# Patient Record
Sex: FEMALE | Race: WHITE | NOT HISPANIC OR LATINO
[De-identification: names, ages, dates, MRNs, and addresses within clinical notes are randomized per-mention and may not be internally consistent; named-entity substitution may affect disease eponyms.]

---

## 2022-07-22 PROBLEM — Z00.00 ENCOUNTER FOR PREVENTIVE HEALTH EXAMINATION: Status: ACTIVE | Noted: 2022-07-22

## 2022-08-02 ENCOUNTER — NON-APPOINTMENT (OUTPATIENT)
Age: 32
End: 2022-08-02

## 2022-08-02 ENCOUNTER — APPOINTMENT (OUTPATIENT)
Dept: GASTROENTEROLOGY | Facility: CLINIC | Age: 32
End: 2022-08-02

## 2022-08-02 VITALS
BODY MASS INDEX: 20.89 KG/M2 | TEMPERATURE: 100.1 F | WEIGHT: 130 LBS | OXYGEN SATURATION: 98 % | SYSTOLIC BLOOD PRESSURE: 100 MMHG | DIASTOLIC BLOOD PRESSURE: 60 MMHG | HEIGHT: 66 IN | HEART RATE: 106 BPM

## 2022-08-02 DIAGNOSIS — U09.9 POST COVID-19 CONDITION, UNSPECIFIED: ICD-10-CM

## 2022-08-02 DIAGNOSIS — R50.9 FEVER, UNSPECIFIED: ICD-10-CM

## 2022-08-02 DIAGNOSIS — D89.40 MAST CELL ACTIVATION, UNSPECIFIED: ICD-10-CM

## 2022-08-02 DIAGNOSIS — R14.2 ERUCTATION: ICD-10-CM

## 2022-08-02 DIAGNOSIS — K21.9 GASTRO-ESOPHAGEAL REFLUX DISEASE W/OUT ESOPHAGITIS: ICD-10-CM

## 2022-08-02 PROCEDURE — 99205 OFFICE O/P NEW HI 60 MIN: CPT

## 2022-08-02 NOTE — PHYSICAL EXAM
[General Appearance - Alert] : alert [Sclera] : the sclera and conjunctiva were normal [Outer Ear] : the ears and nose were normal in appearance [Neck Appearance] : the appearance of the neck was normal [Heart Rate And Rhythm] : heart rate was normal and rhythm regular [Edema] : there was no peripheral edema [Bowel Sounds] : normal bowel sounds [Abdomen Soft] : soft [Abdomen Tenderness] : non-tender [Abdomen Mass (___ Cm)] : no abdominal mass palpated [No CVA Tenderness] : no ~M costovertebral angle tenderness [Abnormal Walk] : normal gait [] : no rash [No Focal Deficits] : no focal deficits [Affect] : the affect was normal

## 2022-08-02 NOTE — HISTORY OF PRESENT ILLNESS
[de-identified] : 31F with hx salmonella poisoning 2017 with ED viist, wisdom teeth, mold exposure presents for GI EVALUATION \par RUQ 2020 bad but went away\par FEB/MARCH 2021 COVID VACCINE \par THEN 4/2021, BELCHING after eating and drinking, ignored it thinking it was indigestion\par some DIARRHEA but was ignoring it (baseline was constipation)\par took a sip of a fruit juice drink and started SNEEZING\par black tarry stool negative for blood\par VOMITING NAUSEOUS then NERVE DAMAGE NUMBNESS AND TINGLING, loss of sensation and weakness\par +SMALL FIBER NEUROPATHY --> s/p LDN, IvIg but cant handle it, gabapentin support\par +SIBO Hydrogen - elemental diet but still belching then retested and was SIBO NEGATIVE and was still belching and not well \par then retested later and was positive again\par  H pylori on GI MAP and did antibiotics and belching didn’t go away\par MUCUS IN HER MOUTH \par FEELS MALNOURISHED \par has seen immunologist, rheumatologist\par HIVES AFTER EATING\par MRI shows stool, fibroids/ovarian cyst. no endometriosis workup \par current diet= gluten free\par surgery= wisdom teeth\par meds= motegrity (on hold right nowfor sibo test)\par FHX= psoriasis, POOJA, afib ,celiac, ALS\par born c/s ,  for a month, kidney reflux as a kid on daily antibiotic for months, on OCP 10 years- BAD PERIODS HEAVY, no excessive antiacids, nsaids. accutane\par s/p month of xifaxan - no help\par motegrity helps constipation\par smartpill= slight delay in stomach/colon\par \par #1 severe reflux that causes sob and belching., nausea, regurgitaiton, mucus, 3cm\par #2 constipation\par #3 hives after eating\par #4 RUQ pain\par

## 2022-08-02 NOTE — ASSESSMENT
[FreeTextEntry1] : 31F with possible long covid, possible MCAS, recurrent SIBO only responded to elemental diet in past, dyssynergic defecation, fibroids/ovarian cysts and abnormal menstruation, small fiber neuropathy, constipation, high zonulin, nml cscope/egd with nml bx of colon, duodenum, esoph, nml hida\par - HRM and impedence study\par - referral to [facility] for long haul\par - f/u neurologist\par - f/u repeat SIBO test\par - consider retesting h pylori\par - continue motegrity as prescribed\par - consider further biofeedback\par - extensive scounseling provided and extesnive chart review\par - f/u 6-8wks

## 2022-11-18 ENCOUNTER — APPOINTMENT (OUTPATIENT)
Dept: GASTROENTEROLOGY | Facility: CLINIC | Age: 32
End: 2022-11-18

## 2022-11-18 PROCEDURE — 99215 OFFICE O/P EST HI 40 MIN: CPT | Mod: 95

## 2022-11-18 NOTE — HISTORY OF PRESENT ILLNESS
[de-identified] : 31F with hx salmonella poisoning 2017 with ED viist, wisdom teeth, mold exposure presents for GI EVALUATION \par 11/18/22\par saw tiffanie parkih did long covid protocol asa, prednisone\par mod stool burden on XRAY on [facility]OTE[facility] \par trialed h1 blocker \par ivig for small fiber neuropathy got ill on 4th round \par bad acne and hair loss \par BELCHING\par bm all over the place incomplete [place] yellow or pale\par [place] yellow stool disintigrates \par \par \par PREVIOUS HX\par RUQ 2020 bad but went away\par FEB/MARCH 2021 COVID VACCINE \par THEN 4/2021, BELCHING after eating and drinking, ignored it thinking it was indigestion\par some DIARRHEA but was ignoring it (baseline was constipation)\par took a sip of a fruit juice drink and started SNEEZING\par black tarry stool negative for blood\par VOMITING NAUSEOUS then NERVE DAMAGE NUMBNESS AND TINGLING, loss of sensation and weakness\par +SMALL FIBER NEUROPATHY --> s/p LDN, IvIg but cant handle it, gabapentin support\par +SIBO Hydrogen - elemental diet but still belching then retested and was SIBO NEGATIVE and was still belching and not well \par then retested later and was positive again\par  H pylori on GI MAP and did antibiotics and belching didn’t go away\par MUCUS IN HER MOUTH \par FEELS MALNOURISHED \par has seen immunologist, rheumatologist\par HIVES AFTER EATING\par MRI shows stool, fibroids/ovarian cyst. no endometriosis workup \par current diet= gluten free\par surgery= wisdom teeth\par meds= motegrity (on hold right nowfor sibo test)\par FHX= psoriasis, POOJA, afib ,celiac, ALS\par born c/s ,  for a month, kidney reflux as a kid on daily antibiotic for months, on OCP 10 years- BAD PERIODS HEAVY, no excessive antiacids, nsaids. accutane\par s/p month of xifaxan - no help\par motegrity helps constipation\par smartpill= slight delay in stomach/colon\par \par #1 severe reflux that causes sob and belching., nausea, regurgitaiton, mucus, 3cm\par #2 constipation\par #3 hives after eating\par #4 RUQ pain\par

## 2022-11-18 NOTE — REASON FOR VISIT
[Follow-up] : a follow-up of an existing diagnosis [Initial Evaluation] : an initial evaluation [Home] : at home, [unfilled] , at the time of the visit. [Medical Office: ([facility])___] : at the medical office located in  [Patient] : the patient [Self] : self

## 2022-11-18 NOTE — ASSESSMENT
[FreeTextEntry1] : 31F with possible long covid, possible MCAS, recurrent SIBO only responded to elemental diet in past, dyssynergic defecation, fibroids/ovarian cysts and abnormal menstruation, small fiber neuropathy, constipation, high zonulin, nml cscope/egd with nml bx of colon, duodenum, esoph, nml hida\par - HRM and impedence study at [place]\par - f.u with [facility] for long haul\par - f/u neurologist\par - f/u repeat SIBO TRIO SMART \par - consider retesting h pylori\par - consider PFPT\par - f/u 6-8wks\par